# Patient Record
(demographics unavailable — no encounter records)

---

## 2025-04-01 NOTE — ASSESSMENT
[FreeTextEntry1] : She has a history of allergies, chronic rhinosinusitis and nasal polyps.  She has large obstructing polyps on the left side.  She has small polyps on the right side.  She also has a deviated septum.  Plan -Findings and management options were discussed with the patient. - I will start her on nasal saline irrigations with budesonide - I am going to place her on a course of antibiotics because of the cloudy drainage on the left side.  We also discussed placing her on a course of oral steroids.  She has taken prednisone in the past.I reviewed some of the associated risks including the risk of mood changes, GI complications and bone issues/fractures. They should take the medication with food, take pepcid, avoid drinking alcohol and avoid strenuous exercise.  I have asked her to speak with her endocrinologist to ensure is okay for her to take oral steroids - Repeat allergy evaluation recommended.  She may benefit from immunotherapy, aspirin desensitization, and/or Dupixent - She is not sure that she wishes to have sinus surgery.  We will discuss this further when she returns.  Will see how she responds to medical therapy - Consider CT scan of the sinuses - Reflux precautions recommended.  She was given literature - Follow-up in approximately 3 weeks - Call and return earlier if any problems or worsening symptoms

## 2025-04-01 NOTE — PHYSICAL EXAM
[TextEntry] : PHYSICAL EXAM  General: The patient was alert, oriented and in no distress. Voice was clear.  Face: The patient had no facial asymmetry or mass. The skin was unremarkable. Bulging eyes  Ears: Hearing normal to conversational voice External ears were normal without deformity. Ear canals were clear. No cerumen or inflammation Tympanic membranes were intact and normal. No perforation or effusion. mobile  Nose:  The external nose had no significant deformity. On anterior rhinoscopy, the nasal mucosa was clear.  The anterior septum was grossly midline.  Oral cavity: Oral mucosa- normal. Oral and base of tongue- clear and without mass. Gingival and buccal mucosa- moist and without lesions. Palate- the palate moved well. There was no cleft palate. There appeared to be good salivary flow.   Oral cavity/oropharynx- no pus, erythema or mass  Neck:  The neck was symmetrical. The parotid and submandibular glands were normal without masses. The trachea was midline and there was no unusual crepitus. Thyroid -mildly enlarged.  No obvious nodules No masses  Lymphatics: Cervical adenopathy- none.     PROCEDURE:   NASAL ENDOSCOPY  Surgeon: Dr. Juarez Indication: Chronic sinusitis, nasal polyps, inadequate exam on anterior rhinoscopy Anesthetic: Topical lidocaine and Afrin  Procedure: The patient was placed in a sitting position.  Following application of the topical anesthetic and decongestant, exam was performed with a flexible telescope.  The scope was passed along the right nasal floor to the nasopharynx.  It was then passed into the region of the middle meatus, middle turbinate, and sphenoethmoid region.  An identical procedure was performed on the left side.  The following findings were noted:   Nasal mucosa: Edematous-pale and boggy Septum: Deviated to the right  Right nasal cavity      Inferior turbinate: Hypertrophic      Middle turbinate: normal      Superior turbinate: normal      Inferior meatus: no pus, polyps or congestion      Middle meatus: Small polyps.  No pus      Superior meatus: Not well-visualized due to the deviated septum and mucosal edema      Sphenoethmoidal recess: Small polyps.  No pus  Left nasal cavity-large obstructing polyps with thick cloudy drainage.      Inferior turbinate: Hypertrophic      Middle turbinate: Partially visualized      Superior turbinate: Not visualized      Inferior meatus: no pus, polyps or congestion      Middle meatus: Polyps      Superior meatus: Not visualized due to polyps      Sphenoethmoidal recess: Not visualized due to polyps  Nasopharynx: no masses, choanae patent, no adenoid tissue

## 2025-04-01 NOTE — HISTORY OF PRESENT ILLNESS
[de-identified] : TOMMY AHUMADA is a 28 year old patient With a history of Graves hyperthyroidism, chronic rhinosinusitis, and nasal polyps.  She had been followed by Dr. Varghese.  She continues to have nasal congestion with loss of smell and taste.  Her symptoms have been getting worse.  She had a procedure in the office with another ENT in 2017 or 18.  It did help for a while.    She had allergy testing in the past which is positive.  No history of immunotherapy.  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  She is not currently on medication She does not smoke or vape She uses nasal saline and Flonase  She is on PTU for hyperthyroidism.  She opted against surgery.

## 2025-07-01 NOTE — PHYSICAL EXAM
[TextEntry] : General: The patient was alert, oriented and in no distress. Voice was clear.  Face: The patient had no facial asymmetry or mass. The skin was unremarkable. Bulging eyes  Ears: Hearing normal to conversational voice External ears were normal without deformity. Ear canals were clear. No cerumen or inflammation Tympanic membranes were intact and normal. No perforation or effusion. mobile  Nose: The external nose had no significant deformity. On anterior rhinoscopy, the nasal mucosa was clear. The anterior septum was grossly midline.  Oral cavity: Oral mucosa- normal. Oral and base of tongue- clear and without mass. Gingival and buccal mucosa- moist and without lesions. Palate- the palate moved well. There was no cleft palate. There appeared to be good salivary flow. Oral cavity/oropharynx- no pus, erythema or mass  Neck: The neck was symmetrical. The parotid and submandibular glands were normal without masses. The trachea was midline and there was no unusual crepitus. Thyroid -mildly enlarged. No obvious nodules No masses  Lymphatics: Cervical adenopathy- none.    PROCEDURE: NASAL ENDOSCOPY  Surgeon: Dr. Juarez Indication: Chronic sinusitis, nasal polyps, inadequate exam on anterior rhinoscopy Anesthetic: Topical lidocaine and Afrin  Procedure: The patient was placed in a sitting position. Following application of the topical anesthetic and decongestant, exam was performed with a flexible telescope. The scope was passed along the right nasal floor to the nasopharynx. It was then passed into the region of the middle meatus, middle turbinate, and sphenoethmoid region. An identical procedure was performed on the left side. The following findings were noted:   Nasal mucosa: Edematous  Septum: Deviated to the right  Right nasal cavity  Inferior turbinate: Hypertrophic  Middle turbinate: normal  Superior turbinate: normal  Inferior meatus: no pus, polyps or congestion  Middle meatus: Small polyps. No pus  Superior meatus: Not well-visualized due to the deviated septum and mucosal edema  Sphenoethmoidal recess: Edema with possible small polyps.  No drainage  Left nasal cavity-large or obstructing polyps.  No drainage.  Inferior turbinate: Hypertrophic  Middle turbinate: Partially visualized  Superior turbinate: Not visualized  Inferior meatus: no pus, polyps or congestion  Middle meatus: Polyps  Superior meatus: Not visualized due to polyps  Sphenoethmoidal recess: Polyps  Nasopharynx: no masses, choanae patent, no adenoid tissue

## 2025-07-01 NOTE — ASSESSMENT
[FreeTextEntry1] : She has chronic rhinosinusitis, nasal polyps, and allergies.  She has persistent bilateral nasal polyps which are worse on the left side.  She also has a deviated septum.  She did have temporary improvement with oral steroids.  Plan -Findings and management options were discussed with the patient. - Continue nasal saline irrigations with budesonide twice daily - I am going to place her on another burst of oral steroids.  I again asked her to take the medication with food, take Pepcid, and avoid drinking alcohol or strenuous exercise. - I recommend repeat allergy evaluation.  If she does not wish to have surgery, she may benefit from immunotherapy, aspirin desensitization, and/or/Dupixent - I am going to send her for CT scan of the sinuses.  We will discuss whether or not she would like to consider surgery - Follow-up after the CT scan - Call and return earlier if any problems or worsening symptoms

## 2025-07-01 NOTE — HISTORY OF PRESENT ILLNESS
[de-identified] : TOMMY AHUMADA is a 28 year old patient Here for follow-up for Graves' hyperthyroidism with exophthalmos, chronic rhinosinusitis, and nasal polyps.  She took the antibiotics and oral steroids.  It helped for a while.  Her symptoms have been recurring.  She has nasal congestion with decreased smell.  She is using nasal saline occasions with budesonide.  She has not yet gone for repeat allergy evaluation  ENT history She had allergy testing in the past which is positive. No history of immunotherapy. 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 She has a history of reflux. She is not currently on medication She does not smoke or vape She uses nasal saline and Flonase  She is on PTU for hyperthyroidism. She opted against surgery.